# Patient Record
(demographics unavailable — no encounter records)

---

## 2024-11-13 NOTE — ASSESSMENT
[FreeTextEntry1] : 74 yof presents w/ severe ankle pain as well as other pain.   Discussed the risks and benefits of opioid medications at length with the patient. She agrees that ultimately weaning off of her medications is in her best interest. I would recommend gradually weaning her Percocet.   Currently taking Percocet  BID. Would recommend decreasing to Percocet 7.5-325 BID.  Physical therapy prescribed - goal will be to increase ROM, strengthening, postural training, other modalities ad gustavo which may include massage and stim. Goals of therapy discussed with the patient in detail and will be discussed with physical therapist. Patient will follow-up following course of physical therapy to monitor progress and adjust therapy as needed.  Acetaminophen 1,000 mg q8h prn for moderate pain. Risks, benefits, and alternatives of acetaminophen discussed with patient.  Ibuprofen 600 mg q8h prn add when pain is not adequately controlled with acetaminophen. Risks, benefits, and alternatives of ibuprofen discussed with patient.  Diet and nutritional strategies discussed which may improve patients pain and will improve overall health.  Patient advised on dieting and lifestyle modifications, as weight loss will be essential to improving the patients pain levels and overall health.  Please reconsult prn for further recommendations.

## 2024-11-13 NOTE — PHYSICAL EXAM
[de-identified] : Constitutional: Well-developed, in no acute distress  Psychiatric: Appropriate mood and affect, oriented to time, place, person, and situation

## 2024-11-13 NOTE — HISTORY OF PRESENT ILLNESS
[FreeTextEntry1] : Verbal consent was given by/on: Humera Thurman on 11/13/24  Patient location: Home, [place], [place]  Physician location: Office, [place], [place]  Reason for Telehealth visit: Ankle pain  74 yof presents w/ chronic pain, worst being in her ankle. She reports that she had three operations performed on her ankle. She also has back pain. Has been told that she can have an ankle revision but she is not interested in that. She takes oxycodone 10 mg BID.  This service took place using a two way audio and visual platform. The patient and Dr. Friend were both able to see each other and communicate through video. There were no barriers to communication. Greater then 50% of the time spent in the encounter involved counseling and coordination of care.   Time spent on visit: 30 minutes

## 2024-11-13 NOTE — CONSULT LETTER
[Dear  ___] : Dear  [unfilled], [Consult Letter:] : I had the pleasure of evaluating your patient, [unfilled]. [Please see my note below.] : Please see my note below. [Consult Closing:] : Thank you very much for allowing me to participate in the care of this patient.  If you have any questions, please do not hesitate to contact me. [Sincerely,] : Sincerely, [FreeTextEntry3] : Titus Friend MD

## 2024-12-18 NOTE — PHYSICAL EXAM
[No Acute Distress] : no acute distress [Normal Sclera/Conjunctiva] : normal sclera/conjunctiva [Normal Outer Ear/Nose] : the outer ears and nose were normal in appearance [No JVD] : no jugular venous distention [No Respiratory Distress] : no respiratory distress  [No Accessory Muscle Use] : no accessory muscle use [No Edema] : there was no peripheral edema [Soft] : abdomen soft [No CVA Tenderness] : no CVA  tenderness [No Joint Swelling] : no joint swelling [No Rash] : no rash [No Skin Lesions] : no skin lesions [Normal] : affect was normal and insight and judgment were intact

## 2024-12-19 NOTE — HISTORY OF PRESENT ILLNESS
[Home] : at home, [unfilled] , at the time of the visit. [Other Location: e.g. Home (Enter Location, City,State)___] : at [unfilled] [Verbal consent obtained from patient] : the patient, [unfilled] [FreeTextEntry1] : F/u for [facility] Quality Diagnosis Review [de-identified] : 74 years old female with HTN, DM, HLD, morbid obesity presents for follow up to review and discuss labs test results; she haris medical clearance form for cataract surgery, scheduled for 9/26.  Televisit made with pt. She is alert and oriented x3. Pt reports she is doing well, still has pain to her ankle which she broke ankle 5 yrs ago. She questioned about her PCP wanting to wean her off Oxycodone. Throughout the visit she spoke about getting the same dose of Oxycodone, discussed other medications to use in place of the Oxycodone, including PT, and the risks of addiction. Pt states she has tried other medications but only the Oxycodone works for her pain.  Instructed her to speak with her PCP and together they can make a plan for how they will proceed with her pain management.  No medications or dagnosis or diagnostic test supporting COPD or CHF.  Last ABHINAV from 2016 on file shows normal EF.

## 2024-12-19 NOTE — ASSESSMENT
[FreeTextEntry1] : 74 years old female with HTN, DM, HLD, morbid obesity presents for follow up to review and discuss labs test results; she haris medical clearance form for cataract surgery, scheduled for 9/26 to be completed.

## 2024-12-19 NOTE — REVIEW OF SYSTEMS
[Lower Ext Edema] : lower extremity edema [Joint Pain] : joint pain [Muscle Weakness] : muscle weakness [Muscle Pain] : muscle pain [Back Pain] : back pain [Skin Rash] : skin rash [Headache] : headache [Unsteady Walking] : ataxia [Negative] : Heme/Lymph [Fever] : no fever [Chills] : no chills [Fatigue] : no fatigue [Vision Problems] : no vision problems [Hearing Loss] : no hearing loss [Shortness Of Breath] : no shortness of breath [Cough] : no cough [Abdominal Pain] : no abdominal pain [Constipation] : no constipation [Incontinence] : no incontinence

## 2024-12-19 NOTE — HISTORY OF PRESENT ILLNESS
[Home] : at home, [unfilled] , at the time of the visit. [Other Location: e.g. Home (Enter Location, City,State)___] : at [unfilled] [Verbal consent obtained from patient] : the patient, [unfilled] [FreeTextEntry1] : F/u for [facility] Quality Diagnosis Review [de-identified] : 74 years old female with HTN, DM, HLD, morbid obesity presents for follow up to review and discuss labs test results; she haris medical clearance form for cataract surgery, scheduled for 9/26.  Televisit made with pt. She is alert and oriented x3. Pt reports she is doing well, still has pain to her ankle which she broke ankle 5 yrs ago. She questioned about her PCP wanting to wean her off Oxycodone. Throughout the visit she spoke about getting the same dose of Oxycodone, discussed other medications to use in place of the Oxycodone, including PT, and the risks of addiction. Pt states she has tried other medications but only the Oxycodone works for her pain.  Instructed her to speak with her PCP and together they can make a plan for how they will proceed with her pain management.  No medications or dagnosis or diagnostic test supporting COPD or CHF.  Last ABHINAV from 2016 on file shows normal EF.

## 2025-03-04 NOTE — PLAN
[FreeTextEntry1] : 1. HTN * c/w amlodipine and furosemide, refilled 2. hypercholesterolemia * referral for fasting labs lipid panel * low cholesterol, low triglycerides diet, dietary counseling given; dietary avoidance discussed; diet and exercise reviewed with patient * c/w atorvastatin 40 mg 3. type 2 DM * referral for fasting labs cmp, a1c * Dietary counseling given, dietary avoidance discussed, diet and exercise reviewed with patient; patient reminded of importance of aerobic exercise, weight control, dietary compliance and regular glucose monitoring * c/w glimepiride and Januvia refilled 4. chronic pain right ankle * referral to orthopedic surgeon ankle * c/w oxycodone- acetaminophen 5/325 mg, refilled 5. lumbar spine disc degeneration * c/w meloxicam * f/u in three months

## 2025-03-04 NOTE — HISTORY OF PRESENT ILLNESS
[FreeTextEntry1] : follow up  [de-identified] : 75 years old female with HTN, DM, HLD, chronic ankle and back pain presents for follow up, still c/o persistent ankle pain, she is due for labs to monitor lipids and a1c, but not fasting, she is requesting refills of oxycodone- APAP, she is on weaning dose of it, discussed  that, will decrease dose to 5/325 mg for one month.

## 2025-06-09 NOTE — HISTORY OF PRESENT ILLNESS
[FreeTextEntry1] : follow up [de-identified] : 75 years old female with HTN, DM, HLD , chronic pain syndrome presents for follow up; she didn't do labs as referred last time, states she lost referral; she is requesting another refill for oxycodone-acetaminophen, was referred to opioid dependance program , she didn't go, states lost referral , to do labs today

## 2025-06-09 NOTE — PHYSICAL EXAM
[No Acute Distress] : no acute distress [Well Developed] : well developed [Well Nourished] : well nourished [Normal Sclera/Conjunctiva] : normal sclera/conjunctiva [Well-Appearing] : well-appearing [PERRL] : pupils equal round and reactive to light [EOMI] : extraocular movements intact [Normal Outer Ear/Nose] : the outer ears and nose were normal in appearance [Normal Oropharynx] : the oropharynx was normal [No JVD] : no jugular venous distention [No Lymphadenopathy] : no lymphadenopathy [Thyroid Normal, No Nodules] : the thyroid was normal and there were no nodules present [Supple] : supple [No Respiratory Distress] : no respiratory distress  [No Accessory Muscle Use] : no accessory muscle use [Clear to Auscultation] : lungs were clear to auscultation bilaterally [Normal Rate] : normal rate  [Regular Rhythm] : with a regular rhythm [Normal S1, S2] : normal S1 and S2 [No Murmur] : no murmur heard [No Carotid Bruits] : no carotid bruits [No Varicosities] : no varicosities [No Abdominal Bruit] : a ~M bruit was not heard ~T in the abdomen [Pedal Pulses Present] : the pedal pulses are present [No Edema] : there was no peripheral edema [No Palpable Aorta] : no palpable aorta [No Extremity Clubbing/Cyanosis] : no extremity clubbing/cyanosis [Soft] : abdomen soft [Non Tender] : non-tender [Non-distended] : non-distended [No Masses] : no abdominal mass palpated [Normal Bowel Sounds] : normal bowel sounds [No HSM] : no HSM [Normal Anterior Cervical Nodes] : no anterior cervical lymphadenopathy [Normal Posterior Cervical Nodes] : no posterior cervical lymphadenopathy [No CVA Tenderness] : no CVA  tenderness [No Spinal Tenderness] : no spinal tenderness [No Joint Swelling] : no joint swelling [Grossly Normal Strength/Tone] : grossly normal strength/tone [No Rash] : no rash [Coordination Grossly Intact] : coordination grossly intact [No Focal Deficits] : no focal deficits [Deep Tendon Reflexes (DTR)] : deep tendon reflexes were 2+ and symmetric [Normal Gait] : normal gait [Normal Affect] : the affect was normal [Normal Insight/Judgement] : insight and judgment were intact

## 2025-06-09 NOTE — PLAN
Pt. presents to ED with mother c/o laceration to right side of chin. Pt. reports playing soccer when he collided with someone and their head hit his chin. Pt. has appx. 1in lac. to chin, bleeding controlled. Pt. denies falling/LOC/AC
[FreeTextEntry1] : 1. HTN * c/w ramipril and amlodipine 2. T2DM * fasting blood work * c/w Januvia and glimepiride * Dietary counseling given, dietary avoidance discussed, diet and exercise reviewed with patient; patient reminded of importance of aerobic exercise, weight control, dietary compliance and regular glucose monitoring 3. hyperlipidemia * low cholesterol, low triglycerides diet, dietary counseling given; dietary avoidance discussed; diet and exercise reviewed with patient * c/w atorvastatin 4. chronic pain syndrome * refill for oxycodone -acetaminophen * urine drug screen 5. opioid dependence * referral for substance abuse program/opioid dependance * f/u as scheduled for CPE

## 2025-07-21 NOTE — HEALTH RISK ASSESSMENT
[Fair] :  ~his/her~ mood as fair [No] : No [No falls in past year] : Patient reported no falls in the past year [0] : 2) Feeling down, depressed, or hopeless: Not at all (0) [PHQ-2 Negative - No further assessment needed] : PHQ-2 Negative - No further assessment needed [Yes] : Reviewed medication list for presence of high-risk medications. [Never] : Never [Patient reported mammogram was normal] : Patient reported mammogram was normal [Patient reported colonoscopy was abnormal] : Patient reported colonoscopy was abnormal [HIV test declined] : HIV test declined [Hepatitis C test declined] : Hepatitis C test declined [Alone] : lives alone [Retired] : retired [] :  [# Of Children ___] : has [unfilled] children [Feels Safe at Home] : Feels safe at home [Fully functional (bathing, dressing, toileting, transferring, walking, feeding)] : Fully functional (bathing, dressing, toileting, transferring, walking, feeding) [Fully functional (using the telephone, shopping, preparing meals, housekeeping, doing laundry, using] : Fully functional and needs no help or supervision to perform IADLs (using the telephone, shopping, preparing meals, housekeeping, doing laundry, using transportation, managing medications and managing finances) [Smoke Detector] : smoke detector [Carbon Monoxide Detector] : carbon monoxide detector [Seat Belt] :  uses seat belt [NO] : No [[id number]] : 0 [Change in mental status noted] : No change in mental status noted [Sexually Active] : not sexually active [Reports changes in hearing] : Reports no changes in hearing [Reports changes in vision] : Reports no changes in vision [Reports changes in dental health] : Reports no changes in dental health [MammogramDate] : 08/2025 [ColonoscopyDate] : 10/2020 [ColonoscopyComments] : polyp; diverticulosis

## 2025-07-21 NOTE — PLAN
[FreeTextEntry1] : 1. annual physical exam * routine general labs done * age appropriate health maintenance recommendations reviewed, discussed including healthy diet, regular exercise 2. HTN * c/w amlodipine, ramipril, refilled 3. type 2 DM * dietary and medication adherence compliance stressed * Dietary counseling given, dietary avoidance discussed, diet and exercise reviewed with patient; patient reminded of importance of aerobic exercise, weight control, dietary compliance and regular glucose monitoring * c/w glimepiride and Januvia, refilled 4. hypercholesterolemia * low cholesterol, low triglycerides diet, dietary counseling given; dietary avoidance discussed; diet and exercise reviewed with patient * c/w atorvastatin 40 mg, refilled 5. morbid obesity * patient counselled on healthy diet with limited calories, regular exercises, low fat choices, limit or eliminate junk food, and to have family meals as often as possible. 6. breast cancer screening * referral for mammogram, due for next month 7. colon cancer screening * she is schedule to see GI for colonoscopy in September * f/u in six months

## 2025-07-21 NOTE — HISTORY OF PRESENT ILLNESS
[de-identified] : 75 years old female presents for annual physical exam, offers no complaints; needs health form to be completed

## 2025-07-28 NOTE — HISTORY OF PRESENT ILLNESS
[Home] : at home, [unfilled] , at the time of the visit. [Other Location: e.g. Home (Enter Location, City,State)___] : at [unfilled] [Telehealth (audio & video)] : This visit was provided via telehealth using real-time 2-way audio visual technology. [Verbal consent obtained from patient] : the patient, [unfilled] [FreeTextEntry1] : [facility] [de-identified] : 74 years old female with HTN, DM, HLD, morbid obesity presents for [facility] Quality Index Review Televisit made with pt. She is alert and oriented x3. Pt reports she is doing well, still has pain to her ankle which she broke ankle 5 yrs ago. She questioned about her PCP wanting to wean her off Oxycodone. Throughout the visit she spoke about getting the same dose of Oxycodone, discussed other medications to use in place of the Oxycodone, including PT, and the risks of addiction. Pt states she has tried other medications but only the Oxycodone works for her pain.  Instructed her to speak with her PCP and together they can make a plan for how they will proceed with her pain management.  No medications or diagnosis or diagnostic test supporting COPD or CHF.  Last ABHINAV from 2016 on file shows normal EF. Pt states she is up to date with all recommended annual assessments. She took 2 COVID test and has ot taken the flu, PNA or any other immunizations.

## 2025-07-28 NOTE — HEALTH RISK ASSESSMENT
[No] : In the past 12 months have you used drugs other than those required for medical reasons? No [No falls in past year] : Patient reported no falls in the past year [0] : 2) Feeling down, depressed, or hopeless: Not at all (0) [de-identified] : not realyy - walk indoors [de-identified] : low salt, low cholesterol, consistent diabetic diet [[id number]] : 0

## 2025-07-28 NOTE — HISTORY OF PRESENT ILLNESS
[Home] : at home, [unfilled] , at the time of the visit. [Other Location: e.g. Home (Enter Location, City,State)___] : at [unfilled] [Telehealth (audio & video)] : This visit was provided via telehealth using real-time 2-way audio visual technology. [Verbal consent obtained from patient] : the patient, [unfilled] [FreeTextEntry1] : F/u for [facility] Quality Diagnosis Review [de-identified] : 74 years old female with HTN, DM, HLD, morbid obesity presents for [facility] Quality Index Review Televisit made with pt. She is alert and oriented x3. Pt reports she is doing well, still has pain to her ankle which she broke ankle 5 yrs ago. She questioned about her PCP wanting to wean her off Oxycodone. Throughout the visit she spoke about getting the same dose of Oxycodone, discussed other medications to use in place of the Oxycodone, including PT, and the risks of addiction. Pt states she has tried other medications but only the Oxycodone works for her pain.  Instructed her to speak with her PCP and together they can make a plan for how they will proceed with her pain management.  No medications or diagnosis or diagnostic test supporting COPD or CHF.  Last ABHINAV from 2016 on file shows normal EF. Pt states she is up to date with all recommended annual assessments. She took 2 COVID test and has ot taken the flu, PNA or any other immunizations.

## 2025-07-28 NOTE — HEALTH RISK ASSESSMENT
[No] : In the past 12 months have you used drugs other than those required for medical reasons? No [No falls in past year] : Patient reported no falls in the past year [0] : 2) Feeling down, depressed, or hopeless: Not at all (0) [de-identified] : not realyy - walk indoors [de-identified] : low salt, low cholesterol, consistent diabetic diet [[id number]] : 0

## 2025-07-28 NOTE — REVIEW OF SYSTEMS
[Cough] : cough [Muscle Weakness] : muscle weakness [Headache] : headache [Dizziness] : dizziness [Unsteady Walking] : ataxia [Insomnia] : insomnia [Negative] : Heme/Lymph [Fever] : no fever [Chills] : no chills [Fatigue] : no fatigue [Vision Problems] : no vision problems [Hearing Loss] : no hearing loss [Chest Pain] : no chest pain [Lower Ext Edema] : no lower extremity edema [Shortness Of Breath] : no shortness of breath [Abdominal Pain] : no abdominal pain [Nausea] : no nausea [Constipation] : no constipation [Vomiting] : no vomiting [Dysuria] : no dysuria [Incontinence] : no incontinence [Hematuria] : no hematuria [Itching] : no itching [Skin Rash] : no skin rash [Suicidal] : not suicidal [Anxiety] : no anxiety [Depression] : no depression [FreeTextEntry6] : Yellow phlegm  [de-identified] : walker or cane

## 2025-07-28 NOTE — PLAN
[FreeTextEntry1] : 1. cont all medications as prescribed 2. keep f/u with pain management and PCP to discuss her pain 3. maintain adequate nutrition and hydration  4. maintain fall precautions  5. cont blood glucose monitoring and diabetic diet

## 2025-07-28 NOTE — REVIEW OF SYSTEMS
[Cough] : cough [Muscle Weakness] : muscle weakness [Headache] : headache [Dizziness] : dizziness [Unsteady Walking] : ataxia [Insomnia] : insomnia [Negative] : Heme/Lymph [Fever] : no fever [Chills] : no chills [Fatigue] : no fatigue [Vision Problems] : no vision problems [Hearing Loss] : no hearing loss [Chest Pain] : no chest pain [Lower Ext Edema] : no lower extremity edema [Shortness Of Breath] : no shortness of breath [Abdominal Pain] : no abdominal pain [Nausea] : no nausea [Constipation] : no constipation [Vomiting] : no vomiting [Dysuria] : no dysuria [Incontinence] : no incontinence [Hematuria] : no hematuria [Itching] : no itching [Skin Rash] : no skin rash [Suicidal] : not suicidal [Anxiety] : no anxiety [Depression] : no depression [FreeTextEntry6] : Yellow phlegm  [de-identified] : walker or cane